# Patient Record
Sex: MALE
[De-identification: names, ages, dates, MRNs, and addresses within clinical notes are randomized per-mention and may not be internally consistent; named-entity substitution may affect disease eponyms.]

---

## 2019-07-22 ENCOUNTER — HOSPITAL ENCOUNTER (OUTPATIENT)
Dept: HOSPITAL 92 - BICCT | Age: 19
Discharge: HOME | End: 2019-07-22
Attending: OTOLARYNGOLOGY
Payer: COMMERCIAL

## 2019-07-22 DIAGNOSIS — R59.0: Primary | ICD-10-CM

## 2019-07-22 PROCEDURE — 70491 CT SOFT TISSUE NECK W/DYE: CPT

## 2019-07-22 NOTE — CT
CT neck soft tissues with contrast:



DATE:

7/22/2019



HISTORY:

19-year-old male with cervical lymphadenopathy



COMPARISON:

None



FINDINGS:

There is cervical lymphadenopathy at all levels (but mostly bilateral levels 2, 3, 4, and 5) that con
sists of abnormally large number of lymph nodes, but mostly size is that are not greatly enlarged.

The exception includes a 2.5 x 1.5 x 1 cm left level 2B lymph node. Left level 2A lymph node is 2.5 x
 1.5 x 1 cm. Large number of left level 5B and 4 supraclavicular lymph nodes that are mildly

enlarged. Adenoids are enlarged, but this is not necessarily abnormal in this age group. Lingual tons
il and palatine tonsils are within normal limits. Effacement of right-sided vallecula and

effacement of bilateral piriform sinuses, suggestive of mucosal thickening no abscess. No thyromegaly
. Trachea and dajuan are patent and clear. Other than the lymphadenopathy, no other abnormality

identified involving submandibular, , parotid, carotid, retropharyngeal, and parapharyngeal
, spaces.



IMPRESSION:

Nonspecific diffuse cervical lymphadenopathy, left greater than right.



Reported By: Payam Guillaume 

Electronically Signed:  7/22/2019 11:52 AM